# Patient Record
Sex: MALE | Race: BLACK OR AFRICAN AMERICAN | Employment: FULL TIME | URBAN - METROPOLITAN AREA
[De-identification: names, ages, dates, MRNs, and addresses within clinical notes are randomized per-mention and may not be internally consistent; named-entity substitution may affect disease eponyms.]

---

## 2017-11-07 ENCOUNTER — HOSPITAL ENCOUNTER (OUTPATIENT)
Dept: GENERAL RADIOLOGY | Age: 42
Discharge: HOME | End: 2017-11-07
Payer: COMMERCIAL

## 2017-11-07 DIAGNOSIS — M54.9 BACK PAIN: ICD-10-CM

## 2017-11-07 PROCEDURE — 72110 X-RAY EXAM L-2 SPINE 4/>VWS: CPT

## 2018-01-02 ENCOUNTER — HOSPITAL ENCOUNTER (EMERGENCY)
Age: 43
Discharge: HOME | End: 2018-01-03
Attending: EMERGENCY MEDICINE | Admitting: EMERGENCY MEDICINE
Payer: COMMERCIAL

## 2018-01-02 VITALS
HEART RATE: 61 BPM | SYSTOLIC BLOOD PRESSURE: 156 MMHG | TEMPERATURE: 98 F | DIASTOLIC BLOOD PRESSURE: 113 MMHG | RESPIRATION RATE: 18 BRPM | OXYGEN SATURATION: 100 %

## 2018-01-02 DIAGNOSIS — M54.42 ACUTE BILATERAL LOW BACK PAIN WITH BILATERAL SCIATICA: Primary | ICD-10-CM

## 2018-01-02 DIAGNOSIS — M54.41 ACUTE BILATERAL LOW BACK PAIN WITH BILATERAL SCIATICA: Primary | ICD-10-CM

## 2018-01-02 PROCEDURE — 99283 EMERGENCY DEPT VISIT LOW MDM: CPT

## 2018-01-02 RX ORDER — LISINOPRIL 10 MG/1
TABLET ORAL DAILY
COMMUNITY

## 2018-01-02 RX ORDER — SIMVASTATIN 10 MG/1
TABLET, FILM COATED ORAL
COMMUNITY

## 2018-01-02 RX ORDER — MONTELUKAST SODIUM 10 MG/1
10 TABLET ORAL DAILY
COMMUNITY

## 2018-01-02 NOTE — LETTER
NOTIFICATION RETURN TO WORK / SCHOOL 
 
1/3/2018 12:21 AM 
 
Mr. Purvi Castillo [address] [place] [address] [phone] To Whom It May Concern: 
 
Purvi Castillo is currently under the care of [facility] EMERGENCY DEPT. He will return to work/school on: 1/5/18 If there are questions or concerns please have the patient contact our office. Sincerely, Salomón Nguyen MD

## 2018-01-03 PROCEDURE — 74011250636 HC RX REV CODE- 250/636: Performed by: EMERGENCY MEDICINE

## 2018-01-03 PROCEDURE — 74011250637 HC RX REV CODE- 250/637: Performed by: EMERGENCY MEDICINE

## 2018-01-03 RX ORDER — NAPROXEN 500 MG/1
500 TABLET ORAL 2 TIMES DAILY WITH MEALS
Qty: 20 TAB | Refills: 1 | Status: SHIPPED | OUTPATIENT
Start: 2018-01-03

## 2018-01-03 RX ORDER — CYCLOBENZAPRINE HCL 5 MG
5 TABLET ORAL
Qty: 15 TAB | Refills: 0 | Status: SHIPPED | OUTPATIENT
Start: 2018-01-03

## 2018-01-03 RX ORDER — ACETAMINOPHEN 500 MG
1000 TABLET ORAL
Qty: 50 TAB | Refills: 0 | Status: SHIPPED | OUTPATIENT
Start: 2018-01-03

## 2018-01-03 RX ORDER — ACETAMINOPHEN 500 MG
1000 TABLET ORAL
Status: COMPLETED | OUTPATIENT
Start: 2018-01-03 | End: 2018-01-03

## 2018-01-03 RX ORDER — DEXAMETHASONE 4 MG/1
12 TABLET ORAL
Status: COMPLETED | OUTPATIENT
Start: 2018-01-03 | End: 2018-01-03

## 2018-01-03 RX ORDER — NAPROXEN 250 MG/1
500 TABLET ORAL
Status: COMPLETED | OUTPATIENT
Start: 2018-01-03 | End: 2018-01-03

## 2018-01-03 RX ADMIN — DEXAMETHASONE 12 MG: 4 TABLET ORAL at 00:23

## 2018-01-03 RX ADMIN — NAPROXEN 500 MG: 250 TABLET ORAL at 00:23

## 2018-01-03 RX ADMIN — ACETAMINOPHEN 1000 MG: 500 TABLET ORAL at 00:23

## 2018-01-03 NOTE — ED PROVIDER NOTES
HPI Comments: Beryle Din is a 38 y.o. Male with no sig pmh other than htn with c/o recurrent worsening lower back pain more so on right sided with leg sx, heaviness, tingling. Has had pain off/on for last month saw pcp who referred to neurology, saw for evaluation for emg but wa told to wait to see how things went. Pt states pain improved, then got worse yesterday with increased heaviness in leg, diff sleeping last night. No saddle anesthesia, diff emptying bladder, defacation issues. Is able to ambulate. Nothing taken. Sx worse with certain positions. Tightness type pain. No h/o cancer, weight loss, previous back surgeries, immunocompromised disease    The history is provided by the patient. Past Medical History:   Diagnosis Date    HTN (hypertension)     Sleep apnea        Past Surgical History:   Procedure Laterality Date    HX ORTHOPAEDIC           History reviewed. No pertinent family history. Social History     Social History    Marital status:      Spouse name: N/A    Number of children: N/A    Years of education: N/A     Occupational History    Not on file. Social History Main Topics    Smoking status: Never Smoker    Smokeless tobacco: Never Used    Alcohol use No    Drug use: No    Sexual activity: Not on file     Other Topics Concern    Not on file     Social History Narrative    No narrative on file         ALLERGIES: Shellfish derived    Review of Systems   Constitutional: Negative for fever. HENT: Negative for sore throat. Respiratory: Negative for shortness of breath. Cardiovascular: Negative for chest pain. Gastrointestinal: Negative for abdominal pain. Endocrine: Negative for polyuria. Genitourinary: Negative for difficulty urinating. Musculoskeletal: Positive for back pain and myalgias. Negative for gait problem. Skin: Negative for rash. Allergic/Immunologic: Negative for immunocompromised state. Neurological: Negative for syncope. Psychiatric/Behavioral: Positive for sleep disturbance. Vitals:    01/02/18 2354   BP: (!) 156/113   Pulse: 61   Resp: 18   Temp: 98 °F (36.7 °C)   SpO2: 100%            Physical Exam   Constitutional: He is oriented to person, place, and time. Non-toxic appearance. He does not appear ill. No distress. HENT:   Head: Normocephalic and atraumatic. Right Ear: External ear normal.   Left Ear: External ear normal.   Nose: Nose normal.   Mouth/Throat: Oropharynx is clear and moist. No oropharyngeal exudate. Eyes: Conjunctivae are normal.   Neck: Normal range of motion. Cardiovascular: Normal rate, regular rhythm, normal heart sounds and intact distal pulses. Pulmonary/Chest: Effort normal and breath sounds normal. No respiratory distress. Abdominal: Soft. There is no tenderness. Musculoskeletal: Normal range of motion. He exhibits no edema. Lumbar back: He exhibits pain and spasm. He exhibits normal range of motion, no bony tenderness, no swelling, no edema and no deformity. Back:    Neurological: He is alert and oriented to person, place, and time. Nl gait. No appreciable le weakness, loss of sensation lt     Skin: Skin is warm and dry. He is not diaphoretic. Psychiatric: His behavior is normal.   Nursing note and vitals reviewed. [facility]  ED Course       Procedures  Vitals:  Patient Vitals for the past 12 hrs:   Temp Pulse Resp BP SpO2   01/02/18 2354 98 °F (36.7 °C) 61 18 (!) 156/113 100 %         Medications ordered:   Medications   dexamethasone (DECADRON) tablet 12 mg (12 mg Oral Given 1/3/18 0023)   naproxen (NAPROSYN) tablet 500 mg (500 mg Oral Given 1/3/18 0023)   acetaminophen (TYLENOL) tablet 1,000 mg (1,000 mg Oral Given 1/3/18 0023)         Lab findings:  No results found for this or any previous visit (from the past 12 hour(s)).     EKG interpretation by ED Physician:      X-Ray, CT or other radiology findings or impressions:  No orders to display   IMPRESSION: 1. Mild lumbar spondylosis as above, without superimposed acute radiographic  abnormality.   11/7/17    Progress notes, Consult notes or additional Procedure notes:   Doubt need for new imaging. No indication for new imaging, consultation  I have discussed with patient and/or family/sig other the results, interpretation of any imaging if performed, suspected diagnosis and treatment plan to include instructions regarding the diagnoses listed to which understanding was expressed with all questions answered        Reevaluation of patient:   Stable for dc    Disposition:  Diagnosis:   1. Acute bilateral low back pain with bilateral sciatica        Disposition: home      Follow-up Information     Follow up With Details Comments Eduar Tony MD Schedule an appointment as soon as possible for a visit  [address] [place] [address]  [phone]      Call the neurologist you saw to get new appointment for repeat evaluation for recurrent pain               Patient's Medications   Start Taking    ACETAMINOPHEN (TYLENOL EXTRA STRENGTH) 500 MG TABLET    Take 2 Tabs by mouth every six (6) hours as needed for Pain. CYCLOBENZAPRINE (FLEXERIL) 5 MG TABLET    Take 1 Tab by mouth nightly as needed for Muscle Spasm(s). NAPROXEN (NAPROSYN) 500 MG TABLET    Take 1 Tab by mouth two (2) times daily (with meals). Continue Taking    BUPROPION HCL (WELLBUTRIN PO)    Take  by mouth. LISINOPRIL (PRINIVIL, ZESTRIL) 10 MG TABLET    Take  by mouth daily. METOPROLOL TARTRATE PO    Take  by mouth. MONTELUKAST (SINGULAIR) 10 MG TABLET    Take 10 mg by mouth daily. SIMVASTATIN (ZOCOR) 10 MG TABLET    Take  by mouth nightly.    These Medications have changed    No medications on file   Stop Taking    No medications on file

## 2024-02-21 ENCOUNTER — HOSPITAL ENCOUNTER (OUTPATIENT)
Facility: HOSPITAL | Age: 49
Setting detail: RECURRING SERIES
Discharge: HOME | End: 2024-02-24
Payer: COMMERCIAL

## 2024-02-21 PROCEDURE — 97545 WORK HARDENING: CPT

## 2024-02-21 PROCEDURE — 97162 PT EVAL MOD COMPLEX 30 MIN: CPT

## 2024-02-21 NOTE — PROGRESS NOTES
[facility]  [address], [address], [place] [address] - Phone: [phone]  Fax: [phone]      PLAN OF CARE / STATEMENT OF MEDICAL NECESSITY FOR PHYSICAL THERAPY SERVICES  Patient Name: Kennedy Jurado : 1975   Medical   Diagnosis: Generalized weakness Treatment Diagnosis: Generalized weakness   Onset Date: 24     Referral Source: David Collado* Start of Care (SOC): 2024   Prior Hospitalization: See medical history Provider #: [id number]   Prior Level of Function: No difficulties with ADLs/ work activity   Comorbidities: OA of knees, back pain   Medications: Verified on Patient Summary List   The Plan of Care and following information is based on the information from the initial evaluation.   Evaluation Complexity:  History:  MEDIUM  Complexity : 1-2 comorbidities / personal factors will impact the outcome/ POC ; Examination:  MEDIUM Complexity : 3 Standardized tests and measures addressin body structure, function, activity limitation and / or participation in recreation  ;Presentation:  MEDIUM Complexity : Evolving with changing characteristics  ;Clinical Decision Making:  Other outcome measures patient history/ demonstration  MEDIUM FOTO score = an established functional score where 100 = no disability  Overall Complexity Rating: MEDIUM  ================================================================  Employer: [employer]  Job Title: PT [employer] and     Assessment / key information:  Kennedy Jurado is a 48 who has been referred for work conditioning.   He reports not feeling well on the day of 24.  He was seen at a local hospital and diagnosed with the flu.  He was prescribed a course of medication to include prednisone.  He had an allergic reaction to the prednisone and developed Rhabdomyolysis for which he was hospitalized for 11 days.  This resulted in acute kidney failure and anemia.

## 2024-02-21 NOTE — PROGRESS NOTES
PHYSICAL / OCCUPATIONAL THERAPY - DAILY TREATMENT NOTE (updated )    Patient Name: Kennedy uJrado    Date: 2024    : 1975  Insurance: Payor: [employer] / Plan: [facility] / Product Type: *No Product type* /      Patient  verified yes     Visit #   Current / Total 1 20   Time   In / Out 11 1230   Pain   In / Out - -   Subjective Functional Status/Changes: See Eval/ POC         TREATMENT AREA =  Muscle weakness (generalized) [M62.81]  Acute kidney failure, unspecified [N17.9]    OBJECTIVE      Therapeutic Procedures:  Tx Min Billable or 1:1 Min (if diff from Tx Min) Procedure, Rationale, Specifics     30623 Work Hardening/Simulation Init 2 hrs: To restore Pts functional level and physical capacity so they can safely return to their occupation.     Details if applicable:  intro to program, establish baselines, job description review                           [facility] Totals Reminder: bill using total billable min of TIMED therapeutic procedures (example: do not include dry needle or estim unattended, both untimed codes, in totals to left)  8-22 min = 1 unit; 23-37 min = 2 units; 38-52 min = 3 units; 53-67 min = 4 units; 68-82 min = 5 units   Total Total     [x]  Patient Education billed concurrently with other procedures   [x] Review HEP    [] Progressed/Changed HEP, detail:    [] Other detail:       Objective Information/Functional Measures/Assessment: [x]  See POC    Goals:  [x]  See POC    PLAN  yes Continue plan of care  []  Upgrade activities as tolerated  []  Discharge due to :  []  Other:    Sheyla Olivares PT    2024    12:44 PM    Future Appointments   Date Time Provider Department Center   2024 11:40 AM Sheyla Olivares, PT [facility]   2024 10:20 AM Sheyla Olivares, PT [facility]   2024 11:40 AM Sheyla Olivares, PT [facility]   3/1/2024 10:20 AM Sheyla Olivares, PT [facility]   3/4/2024 10:20 AM Sheyla Olivares, PT [facility]   3/6/2024 10:20 AM

## 2024-02-23 ENCOUNTER — HOSPITAL ENCOUNTER (OUTPATIENT)
Facility: HOSPITAL | Age: 49
Setting detail: RECURRING SERIES
Discharge: HOME | End: 2024-02-26
Payer: COMMERCIAL

## 2024-02-23 PROCEDURE — 97545 WORK HARDENING: CPT

## 2024-02-23 NOTE — PROGRESS NOTES
PHYSICAL / OCCUPATIONAL THERAPY - DAILY TREATMENT NOTE (updated )    Patient Name: Kennedy Jurado    Date: 2024    : 1975  Insurance: Payor: [employer]ER / Plan: [employer] [facility] [employer] / Product Type: *No Product type* /      Patient  verified Yes     Visit #   Current / Total 2 20   Time   In / Out 1140 1250   Pain   In / Out 1-2 1-2   Subjective Functional Status/Changes: Did ok after initial visit.     TREATMENT AREA =  Muscle weakness (generalized) [M62.81]  Acute kidney failure, unspecified [N17.9]    OBJECTIVE        Therapeutic Procedures:    81038 Work Hardening/Simulation Init 2 hrs: To restore Pts functional level and physical capacity so they can safely return to their occupation.    Tx Min Billable or 1:1 Min   (if diff from Tx Min) Details:   60  See flow sheet as applicable         [facility] Totals Reminder: bill using total billable min of TIMED therapeutic procedures (example: do not include dry needle or estim unattended, both untimed codes, in totals to left)  8-22 min = 1 unit; 23-37 min = 2 units; 38-52 min = 3 units; 53-67 min = 4 units; 68-82 min = 5 units   Total Total     [x]  Patient Education billed concurrently with other procedures   [x] Review HEP    [] Progressed/Changed HEP, detail:    [] Other detail:       Objective Information/Functional Measures/Assessment    material handling:see flow sheet    static postures- see flow sheet    Pt reported dizziness following material handling activities.  Noted clamminess.  BP taken at 139/107  Pulse O2= 96    Patient sat and monitored 15 minutes.  Reported feeling fine.  Driven home by family member      Patient will continue to benefit from skilled PT / OT services to modify and progress therapeutic interventions, analyze and address functional mobility deficits, analyze and address ROM deficits, analyze and address strength deficits, and analyze and cue for proper movement patterns to address functional deficits and attain

## 2024-02-26 ENCOUNTER — HOSPITAL ENCOUNTER (OUTPATIENT)
Facility: HOSPITAL | Age: 49
Setting detail: RECURRING SERIES
Discharge: HOME | End: 2024-02-29
Payer: COMMERCIAL

## 2024-02-26 PROCEDURE — 97545 WORK HARDENING: CPT

## 2024-02-26 NOTE — PROGRESS NOTES
PHYSICAL / OCCUPATIONAL THERAPY - DAILY TREATMENT NOTE (updated )    Patient Name: Kennedy Jurado    Date: 2024    : 1975  Insurance: Payor: [employer]ER / Plan: [employer] [facility] / Product Type: *No Product type* /      Patient  verified Yes     Visit #   Current / Total 3 20   Time   In / Out 900 1010   Pain   In / Out 1 1   Subjective Functional Status/Changes: Recovered well after last visit.  No reported soreness.     TREATMENT AREA =  Muscle weakness (generalized) [M62.81]  Acute kidney failure, unspecified [N17.9]    OBJECTIVE    Therapeutic Procedures:    42158 Work Hardening/Simulation Init 2 hrs: To restore Pts functional level and physical capacity so they can safely return to their occupation.    Tx Min Billable or 1:1 Min   (if diff from Tx Min) Details:   70  See flow sheet as applicable         [facility] Totals Reminder: bill using total billable min of TIMED therapeutic procedures (example: do not include dry needle or estim unattended, both untimed codes, in totals to left)  8-22 min = 1 unit; 23-37 min = 2 units; 38-52 min = 3 units; 53-67 min = 4 units; 68-82 min = 5 units   Total Total     [x]  Patient Education billed concurrently with other procedures   [x] Review HEP    [] Progressed/Changed HEP, detail:    [] Other detail:       Objective Information/Functional Measures/Assessment    material handling:added push/ pull with 20# in sled      static postures- see flow sheet.  Added 10 min stand      Patient will continue to benefit from skilled PT / OT services to modify and progress therapeutic interventions, analyze and address functional mobility deficits, analyze and address ROM deficits, analyze and address strength deficits, and analyze and cue for proper movement patterns to address functional deficits and attain remaining goals.    Progress toward goals / Updated goals:  []  See Progress Note/Recertification  __x_ progression of material handling- see flow

## 2024-02-28 ENCOUNTER — HOSPITAL ENCOUNTER (OUTPATIENT)
Facility: HOSPITAL | Age: 49
Setting detail: RECURRING SERIES
Discharge: HOME | End: 2024-03-02
Payer: COMMERCIAL

## 2024-02-28 PROCEDURE — 97545 WORK HARDENING: CPT

## 2024-02-28 NOTE — PROGRESS NOTES
PHYSICAL / OCCUPATIONAL THERAPY - DAILY TREATMENT NOTE (updated )    Patient Name: Kennedy Jurado    Date: 2024    : 1975  Insurance: Payor: [employer]ER / Plan: [employer] [facility] [employer] / Product Type: *No Product type* /      Patient  verified Yes     Visit #   Current / Total 4 20   Time   In / Out 10 1130   Pain   In / Out 1 0   Subjective Functional Status/Changes: Did well after last visit.  Able to perform ADLs after     TREATMENT AREA =  Muscle weakness (generalized) [M62.81]  Acute kidney failure, unspecified [N17.9]    OBJECTIVE        Therapeutic Procedures:    72031 Work Hardening/Simulation Init 2 hrs: To restore Pts functional level and physical capacity so they can safely return to their occupation.    Tx Min Billable or 1:1 Min   (if diff from Tx Min) Details:   90  See flow sheet as applicable       90  [facility] Totals Reminder: bill using total billable min of TIMED therapeutic procedures (example: do not include dry needle or estim unattended, both untimed codes, in totals to left)  8-22 min = 1 unit; 23-37 min = 2 units; 38-52 min = 3 units; 53-67 min = 4 units; 68-82 min = 5 units   Total Total     [x]  Patient Education billed concurrently with other procedures   [x] Review HEP    [] Progressed/Changed HEP, detail:    [] Other detail:       Objective Information/Functional Measures/Assessment    material handling:    static postures- see flow sheet; increased stand time, increase amb time, increased duration of WC      Patient will continue to benefit from skilled PT / OT services to modify and progress therapeutic interventions, analyze and address functional mobility deficits, analyze and address ROM deficits, analyze and address strength deficits, and analyze and cue for proper movement patterns to address functional deficits and attain remaining goals.    Progress toward goals / Updated goals:  []  See Progress Note/Recertification  ___ progression of material handling- see

## 2024-03-01 ENCOUNTER — HOSPITAL ENCOUNTER (OUTPATIENT)
Facility: HOSPITAL | Age: 49
Setting detail: RECURRING SERIES
Discharge: HOME | End: 2024-03-04
Payer: COMMERCIAL

## 2024-03-01 PROCEDURE — 97545 WORK HARDENING: CPT

## 2024-03-01 NOTE — PROGRESS NOTES
PHYSICAL / OCCUPATIONAL THERAPY - DAILY TREATMENT NOTE (updated )    Patient Name: Kennedy Jurado    Date: 3/1/2024    : 1975  Insurance: Payor: [employer]ER / Plan: [employer] [facility] [employer] / Product Type: *No Product type* /      Patient  verified Yes     Visit #   Current / Total 5 20   Time   In / Out 950 1150   Pain   In / Out 3 1   Subjective Functional Status/Changes: Doing OK--tolerating duration increase well     TREATMENT AREA =  Muscle weakness (generalized) [M62.81]  Acute kidney failure, unspecified [N17.9]    OBJECTIVE        Therapeutic Procedures:    88142 Work Hardening/Simulation Init 2 hrs: To restore Pts functional level and physical capacity so they can safely return to their occupation.    Tx Min Billable or 1:1 Min   (if diff from Tx Min) Details:   120  See flow sheet as applicable       120  [facility] Totals Reminder: bill using total billable min of TIMED therapeutic procedures (example: do not include dry needle or estim unattended, both untimed codes, in totals to left)  8-22 min = 1 unit; 23-37 min = 2 units; 38-52 min = 3 units; 53-67 min = 4 units; 68-82 min = 5 units   Total Total     [x]  Patient Education billed concurrently with other procedures   [x] Review HEP    [] Progressed/Changed HEP, detail:    [] Other detail:       Objective Information/Functional Measures/Assessment    material handling:increase reps of lifts, increase weight and reps with push/ pull    static postures- see flow sheet      Patient will continue to benefit from skilled PT / OT services to modify and progress therapeutic interventions, analyze and address functional mobility deficits, analyze and address ROM deficits, analyze and address strength deficits, and analyze and cue for proper movement patterns to address functional deficits and attain remaining goals.    Progress toward goals / Updated goals:  []  See Progress Note/Recertification  __x_ progression of material handling- see flow

## 2024-03-04 ENCOUNTER — HOSPITAL ENCOUNTER (OUTPATIENT)
Facility: HOSPITAL | Age: 49
Setting detail: RECURRING SERIES
Discharge: HOME | End: 2024-03-07
Payer: COMMERCIAL

## 2024-03-04 PROCEDURE — 97545 WORK HARDENING: CPT

## 2024-03-04 NOTE — PROGRESS NOTES
/ Updated goals:  []  See Progress Note/Recertification  ___ progression of material handling- see flow sheet    ___ progression of static postures- see flow sheet    ___ progression of reach/ / handling--see flow sheet          PLAN  - Continue Plan of Care  - Upgrade activities as tolerated    Sheyla Olivares, PT    3/4/2024    7:53 AM    Future Appointments   Date Time Provider Department Center   3/4/2024 10:20 AM Olivares, Sheyla, PT [facility]   3/6/2024 10:20 AM Olivares, Sheyla, PT [facility]   3/8/2024 12:20 PM Olivares, Sheyla, PT [facility]   3/11/2024 10:20 AM Olivares, Sheyla, PT [facility]   3/13/2024 10:20 AM Olivares, Sheyla, PT [facility]   3/15/2024  9:40 AM Taye Carrillo, PT [facility]   3/18/2024 10:20 AM Taye Carrillo, PT [facility]   3/20/2024 10:20 AM Olivares, Sheyla, PT [facility]   3/22/2024 10:20 AM Olivares, Sheyla, PT [facility]   3/25/2024 10:20 AM Olivares, Sheyla, PT [facility]   3/27/2024 10:20 AM Olivares, Sheyla, PT [facility]   3/29/2024 10:20 AM Olivares, Sheyla, PT [facility]

## 2024-03-06 ENCOUNTER — HOSPITAL ENCOUNTER (OUTPATIENT)
Facility: HOSPITAL | Age: 49
Setting detail: RECURRING SERIES
Discharge: HOME | End: 2024-03-09
Payer: COMMERCIAL

## 2024-03-06 PROCEDURE — 97545 WORK HARDENING: CPT

## 2024-03-06 NOTE — PROGRESS NOTES
PHYSICAL / OCCUPATIONAL THERAPY - DAILY TREATMENT NOTE (updated )    Patient Name: Kennedy Jurado    Date: 3/6/2024    : 1975  Insurance: Payor: [employer]ER / Plan: [employer] [facility] [employer] / Product Type: *No Product type* /      Patient  verified Yes     Visit #   Current / Total 6 20   Time   In / Out 10 12   Pain   In / Out 2 1   Subjective Functional Status/Changes: Tolerating activity better.  Went to gym with wife yesterday     TREATMENT AREA =  Muscle weakness (generalized) [M62.81]  Acute kidney failure, unspecified [N17.9]    OBJECTIVE        Therapeutic Procedures:    81581 Work Hardening/Simulation Init 2 hrs: To restore Pts functional level and physical capacity so they can safely return to their occupation.    Tx Min Billable or 1:1 Min   (if diff from Tx Min) Details:   120  See flow sheet as applicable       120   [facility] Totals Reminder: bill using total billable min of TIMED therapeutic procedures (example: do not include dry needle or estim unattended, both untimed codes, in totals to left)  8-22 min = 1 unit; 23-37 min = 2 units; 38-52 min = 3 units; 53-67 min = 4 units; 68-82 min = 5 units   Total Total     [x]  Patient Education billed concurrently with other procedures   [x] Review HEP    [] Progressed/Changed HEP, detail:    [] Other detail:       Objective Information/Functional Measures/Assessment    material handling:lifts/ carries at 35#    static postures- see flow sheet      Patient will continue to benefit from skilled PT / OT services to modify and progress therapeutic interventions, analyze and address functional mobility deficits, analyze and address ROM deficits, analyze and address strength deficits, and analyze and cue for proper movement patterns to address functional deficits and attain remaining goals.    Progress toward goals / Updated goals:  []  See Progress Note/Recertification  __x_ progression of material handling- see flow sheet    __x_ progression of static

## 2024-03-08 ENCOUNTER — HOSPITAL ENCOUNTER (OUTPATIENT)
Facility: HOSPITAL | Age: 49
Setting detail: RECURRING SERIES
Discharge: HOME | End: 2024-03-11
Payer: COMMERCIAL

## 2024-03-08 PROCEDURE — 97545 WORK HARDENING: CPT

## 2024-03-08 NOTE — PROGRESS NOTES
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    Tx Min Billable or 1:1 Min   (if diff from Tx Min) Details:   120  See flow sheet as applicable       120   [facility] Totals Reminder: bill using total billable min of TIMED therapeutic procedures (example: do not include dry needle or estim unattended, both untimed codes, in totals to left)  8-22 min = 1 unit; 23-37 min = 2 units; 38-52 min = 3 units; 53-67 min = 4 units; 68-82 min = 5 units   Total Total     [x]  Patient Education billed concurrently with other procedures   [x] Review HEP    [] Progressed/Changed HEP, detail:    [] Other detail:       Objective Information/Functional Measures/Assessment    material handling:increased reps with 35# lifts, increase distance carried to 40 feet  static postures- see flow sheet      Patient will continue to benefit from skilled PT / OT services to modify and progress therapeutic interventions, analyze and address functional mobility deficits, analyze and address ROM deficits, analyze and address strength deficits, and analyze and cue for proper movement patterns to address functional deficits and attain remaining goals.    Progress toward goals / Updated goals:  []  See Progress Note/Recertification  _x__ progression of material handling- see

## 2024-03-11 ENCOUNTER — HOSPITAL ENCOUNTER (OUTPATIENT)
Facility: HOSPITAL | Age: 49
Setting detail: RECURRING SERIES
Discharge: HOME | End: 2024-03-14
Payer: COMMERCIAL

## 2024-03-11 PROCEDURE — 97545 WORK HARDENING: CPT

## 2024-03-11 NOTE — PROGRESS NOTES
progression of material handling- see flow sheet    ___ progression of static postures- see flow sheet    ___ progression of reach/ / handling--see flow sheet          PLAN  - Continue Plan of Care  - Upgrade activities as tolerated    Sheyla Olivares, JOHN    3/11/2024    10:20 AM    Future Appointments   Date Time Provider Department Center   3/13/2024 10:20 AM Sheyla Olivares, PT [facility]   3/15/2024  9:40 AM Taye Carrillo, PT [facility]   3/18/2024 10:20 AM Taye Carrillo, PT [facility]   3/20/2024 10:20 AM Sheyla Olivares, PT [facility]   3/22/2024 10:20 AM Sheyla Olivares, PT [facility]   3/25/2024 10:20 AM Sheyla Olivares, PT [facility]   3/27/2024 10:20 AM Sheyla Olivares, PT [facility]   3/29/2024 10:20 AM Sheyla Olivares, PT [facility]

## 2024-03-13 ENCOUNTER — HOSPITAL ENCOUNTER (OUTPATIENT)
Facility: HOSPITAL | Age: 49
Setting detail: RECURRING SERIES
Discharge: HOME | End: 2024-03-16
Payer: COMMERCIAL

## 2024-03-13 PROCEDURE — 97545 WORK HARDENING: CPT

## 2024-03-13 NOTE — PROGRESS NOTES
PHYSICAL / OCCUPATIONAL THERAPY - DAILY TREATMENT NOTE (updated )    Patient Name: Kennedy Jurado    Date: 3/13/2024    : 1975  Insurance: Payor: [employer] / Plan: [employer] [facility] [employer] / Product Type: *No Product type* /      Patient  verified Yes     Visit #   Current / Total 9 20   Time   In / Out 10 12   Pain   In / Out 0 0   Subjective Functional Status/Changes: 90% improved  Program is comfortably challenging     TREATMENT AREA =  Muscle weakness (generalized) [M62.81]  Acute kidney failure, unspecified [N17.9]    OBJECTIVE    Therapeutic Procedures:    29596 Work Hardening/Simulation Init 2 hrs: To restore Pts functional level and physical capacity so they can safely return to their occupation.    Tx Min Billable or 1:1 Min   (if diff from Tx Min) Details:   120  See flow sheet as applicable       120  MC BC Totals Reminder: bill using total billable min of TIMED therapeutic procedures (example: do not include dry needle or estim unattended, both untimed codes, in totals to left)  8-22 min = 1 unit; 23-37 min = 2 units; 38-52 min = 3 units; 53-67 min = 4 units; 68-82 min = 5 units   Total Total     [x]  Patient Education billed concurrently with other procedures   [x] Review HEP    [] Progressed/Changed HEP, detail:    [] Other detail:       Objective Information/Functional Measures/Assessment    material handling:increasing reps with material handling; comfortably challenged    static postures- see flow sheet      Patient will continue to benefit from skilled PT / OT services to modify and progress therapeutic interventions, analyze and address functional mobility deficits, analyze and address ROM deficits, analyze and address strength deficits, and analyze and cue for proper movement patterns to address functional deficits and attain remaining goals.    Progress toward goals / Updated goals:  []  See Progress Note/Recertification  _x__ progression of material handling- see flow sheet

## 2024-03-13 NOTE — PROGRESS NOTES
CLAIRE CISNEROS [facility]  [address], [address], [place] [address]  Phone: [phone]  Fax: [phone]  PROGRESS NOTE - WORKERS COMP  Patient Name: Kennedy Jurado : 1975   Treatment/Medical Diagnosis: Muscle weakness (generalized) [M62.81]  Acute kidney failure, unspecified [N17.9]   Referral Source: David Collado*     Date of Initial Visit: 24 Attended Visits: 9 Missed Visits: -     FUNCTIONAL OUTCOME QUESTIONNAIRE USED:  [] LEFS [] QDASH [] Oswestry [] NDI  [] Other:              PROGRESS >75%  improved 50-75%  improved 25-50%   improved <25%   improved   [x] Increased ROM [] [x] [] []   [x] Increased Strength  [] [x] [] []   [x] Improved Gait [] [x] [] []   [x] Improved Balance [] [x] [] []   [x] Decrease Pain/symptoms [x] [] [] []   [] Improved ADLs/Self care [] [] [] []   [x] Return to Work Activity   [] [x] [] []     FUNCTIONAL WORK RELATED CHANGES (Maximum Ability - Sit, Stand, Lift, Reach, Push, Pull):  Patient tolerates 2 hours of continuous activity with minimal difficulty.  He is able to perform lifts/ carries at 45#.  Standing tolerance has improved.  He requires less breaks when participating in the work conditioning program.     REMAINING DEFICITS:  Endurance for full day activity      [x]SAME GOALS/     [] NEW GOALS:       TREATMENT PROVIDED:         [] Modalities [] Manual Therapy [x] Ther Ex [x]Ther/Work Activity    [x] Neuro Re-ed []Gait Training [x] Patient Education [] HEP   [x] Body Mechanics [x] Lifting [] Posture    []  Other:     BARRIERS TO PROGRESS: no If YES please explain:     PLAN: [x] Continue Therapy with Anticipated DC in __2-4_ Weeks/Visits   [] Hold Therapy; Awaiting MD Orders    [] Recommend Work Conditioning   [] Recommend FCE   [] Hold Therapy; refer to MD for Medical Intervention   [] Discharge from Skilled Therapy    If you have any questions/comments please contact us directly at [phone]

## 2024-03-15 ENCOUNTER — HOSPITAL ENCOUNTER (OUTPATIENT)
Facility: HOSPITAL | Age: 49
Setting detail: RECURRING SERIES
Discharge: HOME | End: 2024-03-18
Payer: COMMERCIAL

## 2024-03-15 PROCEDURE — 97545 WORK HARDENING: CPT

## 2024-03-15 NOTE — PROGRESS NOTES
PHYSICAL / OCCUPATIONAL THERAPY - DAILY TREATMENT NOTE (updated )    Patient Name: Kennedy Jurado    Date: 3/15/2024    : 1975  Insurance: Payor: [employer] / Plan: [employer] [facility] [employer] / Product Type: *No Product type* /      Patient  verified Yes   Visit #   Current / Total 5 12   Time   In / Out 10:00 12:00   Pain   In / Out 0 0   Subjective Functional Status/Changes: Pt reports no issues prior to session     TREATMENT AREA =  Muscle weakness (generalized) [M62.81]  Acute kidney failure, unspecified [N17.9]    OBJECTIVE         19984 Work Hardening/Simulation Init 2 hrs: To restore Pts functional level and physical capacity so they can safely return to their occupation.    Tx Min Billable or 1:1 Min   (if diff from Tx Min) Details:   120   See flow sheet as applicable       Therapeutic Procedures:  [x]  Patient Education billed concurrently with other procedures   [x] Review HEP    [] Progressed/Changed HEP, detail:    [] Other detail:       Objective Information/Functional Measures/Assessment    No discomfort with exercises. Increased exercises per flowsheet    Patient will continue to benefit from skilled PT / OT services to modify and progress therapeutic interventions, analyze and address functional mobility deficits, analyze and address ROM deficits, and analyze and address strength deficits to address functional deficits and attain remaining goals.    Progress toward goals / Updated goals:  []  See Progress Note/Recertification    Good Progress to    [] STG    [x] LTG  1 as shown by improved ability to perform exercises    PLAN  Yes Continue plan of care  [x]  Upgrade activities as tolerated  []  Discharge due to :  []  Other:    Taye Carrillo PT    3/15/2024    2:22 PM    Future Appointments   Date Time Provider Department Center   3/18/2024 10:20 AM Taye Carrillo PT [facility]   3/20/2024 10:20 AM Sheyla Olivares PT [facility]   3/22/2024 10:20 AM Sheyla Olivares PT [facility]

## 2024-03-18 ENCOUNTER — HOSPITAL ENCOUNTER (OUTPATIENT)
Facility: HOSPITAL | Age: 49
Setting detail: RECURRING SERIES
Discharge: HOME | End: 2024-03-21
Payer: COMMERCIAL

## 2024-03-18 PROCEDURE — 97545 WORK HARDENING: CPT

## 2024-03-18 NOTE — PROGRESS NOTES
Sheyla, PT [facility]   3/22/2024 10:20 AM OlivaresNeisha zuñigaa, PT [facility]   3/25/2024 10:20 AM OlivaresNeisha zuñigaa, PT [facility]   3/27/2024 10:20 AM OlivaresNeisha zuñigaa, PT [facility]   3/29/2024 10:20 AM OlivaresNeisha zuñigaa, PT [facility]

## 2024-03-20 ENCOUNTER — HOSPITAL ENCOUNTER (OUTPATIENT)
Facility: HOSPITAL | Age: 49
Setting detail: RECURRING SERIES
Discharge: HOME | End: 2024-03-23
Payer: COMMERCIAL

## 2024-03-20 PROCEDURE — 97545 WORK HARDENING: CPT

## 2024-03-20 NOTE — PROGRESS NOTES
PHYSICAL / OCCUPATIONAL THERAPY - DAILY TREATMENT NOTE (updated )    Patient Name: Kennedy Jurado    Date: 3/20/2024    : 1975  Insurance: Payor: [employer] / Plan: [employer] [facility] [employer] / Product Type: *No Product type* /      Patient  verified Yes     Visit #   Current / Total 12    Time   In / Out 1000 1200   Pain   In / Out - -   Subjective Functional Status/Changes: Comfortably challenged rosita with increase in reps.    Saw MD.  Pleased with lab work.  To RTW after spring break     TREATMENT AREA =  Muscle weakness (generalized) [M62.81]  Acute kidney failure, unspecified [N17.9]    OBJECTIVE    Therapeutic Procedures:    05902 Work Hardening/Simulation Init 2 hrs: To restore Pts functional level and physical capacity so they can safely return to their occupation.    Tx Min Billable or 1:1 Min   (if diff from Tx Min) Details:   120  See flow sheet as applicable       120  MC BC Totals Reminder: bill using total billable min of TIMED therapeutic procedures (example: do not include dry needle or estim unattended, both untimed codes, in totals to left)  8-22 min = 1 unit; 23-37 min = 2 units; 38-52 min = 3 units; 53-67 min = 4 units; 68-82 min = 5 units   Total Total     [x]  Patient Education billed concurrently with other procedures   [x] Review HEP    [] Progressed/Changed HEP, detail:    [] Other detail:       Objective Information/Functional Measures/Assessment    material handlin# lifts/ carries.  Increase in reps, see flow sheet    static postures- see flow sheet; increased standing duration      Patient will continue to benefit from skilled PT / OT services to modify and progress therapeutic interventions, analyze and address functional mobility deficits, analyze and address ROM deficits, analyze and address strength deficits, and analyze and cue for proper movement patterns to address functional deficits and attain remaining goals.    Progress toward goals / Updated goals:  []  See

## 2024-03-22 ENCOUNTER — HOSPITAL ENCOUNTER (OUTPATIENT)
Facility: HOSPITAL | Age: 49
Setting detail: RECURRING SERIES
Discharge: HOME | End: 2024-03-25
Payer: COMMERCIAL

## 2024-03-22 PROCEDURE — 97545 WORK HARDENING: CPT

## 2024-03-22 NOTE — PROGRESS NOTES
PHYSICAL / OCCUPATIONAL THERAPY - DAILY TREATMENT NOTE (updated )    Patient Name: Kennedy Jurado    Date: 3/22/2024    : 1975  Insurance: Payor: [employer] / Plan: [employer] [facility] [employer] / Product Type: *No Product type* /      Patient  verified Yes     Visit #   Current / Total 13    Time   In / Out 1000 1200   Pain   In / Out - -   Subjective Functional Status/Changes: Doing well, comfortably challenged     TREATMENT AREA =  Muscle weakness (generalized) [M62.81]  Acute kidney failure, unspecified [N17.9]    OBJECTIVE      Therapeutic Procedures:    00568 Work Hardening/Simulation Init 2 hrs: To restore Pts functional level and physical capacity so they can safely return to their occupation.    Tx Min Billable or 1:1 Min   (if diff from Tx Min) Details:   120  See flow sheet as applicable       120  MC BC Totals Reminder: bill using total billable min of TIMED therapeutic procedures (example: do not include dry needle or estim unattended, both untimed codes, in totals to left)  8-22 min = 1 unit; 23-37 min = 2 units; 38-52 min = 3 units; 53-67 min = 4 units; 68-82 min = 5 units   Total Total     [x]  Patient Education billed concurrently with other procedures   [x] Review HEP    [] Progressed/Changed HEP, detail:    [] Other detail:       Objective Information/Functional Measures/Assessment    material handling:progressing reps with heaviest lift of 45#    static postures- see flow sheet      Patient will continue to benefit from skilled PT / OT services to modify and progress therapeutic interventions, analyze and address functional mobility deficits, analyze and address ROM deficits, analyze and address strength deficits, and analyze and cue for proper movement patterns to address functional deficits and attain remaining goals.    Progress toward goals / Updated goals:  []  See Progress Note/Recertification  __x_ progression of material handling- see flow sheet    ___ progression of static

## 2024-03-25 ENCOUNTER — HOSPITAL ENCOUNTER (OUTPATIENT)
Facility: HOSPITAL | Age: 49
Setting detail: RECURRING SERIES
Discharge: HOME | End: 2024-03-28
Payer: COMMERCIAL

## 2024-03-25 PROCEDURE — 97545 WORK HARDENING: CPT

## 2024-03-25 NOTE — PROGRESS NOTES
PHYSICAL / OCCUPATIONAL THERAPY - DAILY TREATMENT NOTE (updated )    Patient Name: Kennedy Jurado    Date: 3/25/2024    : 1975  Insurance: Payor: [employer] / Plan: [employer] [facility] [employer] / Product Type: *No Product type* /      Patient  verified Yes     Visit #   Current / Total 14    Time   In / Out 1000 1200   Pain   In / Out 0 0   Subjective Functional Status/Changes: Doing well.  No c/o.  Felt the 50# lift     TREATMENT AREA =  Muscle weakness (generalized) [M62.81]  Acute kidney failure, unspecified [N17.9]    OBJECTIVE        Therapeutic Procedures:    39927 Work Hardening/Simulation Init 2 hrs: To restore Pts functional level and physical capacity so they can safely return to their occupation.    Tx Min Billable or 1:1 Min   (if diff from Tx Min) Details:   120  See flow sheet as applicable       120   BC Totals Reminder: bill using total billable min of TIMED therapeutic procedures (example: do not include dry needle or estim unattended, both untimed codes, in totals to left)  8-22 min = 1 unit; 23-37 min = 2 units; 38-52 min = 3 units; 53-67 min = 4 units; 68-82 min = 5 units   Total Total     [x]  Patient Education billed concurrently with other procedures   [x] Review HEP    [] Progressed/Changed HEP, detail:    [] Other detail:       Objective Information/Functional Measures/Assessment    material handlin# lift and carry    static postures- see flow sheet      Patient will continue to benefit from skilled PT / OT services to modify and progress therapeutic interventions, analyze and address functional mobility deficits, analyze and address ROM deficits, analyze and address strength deficits, and analyze and cue for proper movement patterns to address functional deficits and attain remaining goals.    Progress toward goals / Updated goals:  []  See Progress Note/Recertification  _x__ progression of material handling- see flow sheet    ___ progression of static postures- see flow

## 2024-03-27 ENCOUNTER — HOSPITAL ENCOUNTER (OUTPATIENT)
Facility: HOSPITAL | Age: 49
Setting detail: RECURRING SERIES
Discharge: HOME | End: 2024-03-30
Payer: COMMERCIAL

## 2024-03-27 PROCEDURE — 97545 WORK HARDENING: CPT

## 2024-03-27 NOTE — PROGRESS NOTES
see flow sheet    ___ progression of reach/ / handling--see flow sheet        PLAN  - Continue Plan of Care  - Upgrade activities as tolerated    Sheyla Olivares PT    3/27/2024    8:41 AM    Future Appointments   Date Time Provider Department [place]   3/27/2024 10:20 AM [facility]   3/29/2024 10:20 AM [facility]

## 2024-03-29 ENCOUNTER — HOSPITAL ENCOUNTER (OUTPATIENT)
Facility: HOSPITAL | Age: 49
Setting detail: RECURRING SERIES
Discharge: HOME | End: 2024-04-01
Payer: COMMERCIAL

## 2024-03-29 PROCEDURE — 97545 WORK HARDENING: CPT

## 2024-03-29 NOTE — PROGRESS NOTES
CLAIRE CISNEROS [facility] ([place] [place]) - [facility]  [address], [address], [place] [address]  Phone: [phone]  Fax: [phone]  PT DISCHARGE NOTE - WORK CONDITIONING  Patient Name: Kennedy Jurado : 1975   Treatment/Medical Diagnosis: Muscle weakness (generalized) [M62.81]  Acute kidney failure, unspecified [N17.9]   Referral Source: David Collado*     Date of Initial Visit: 24 Attended Visits: 16 Missed Visits: 1           PROGRESS >75%  improved 50-75%  improved 25-50%   improved <25%   improved   [x] Increased ROM [x] [] [] []   [x] Increased Strength  [x] [] [] []   [x] Improved Gait [x] [] [] []   [x] Improved Balance [x] [] [] []   [x] Decrease Pain/symptoms [x] [] [] []   [x] Improved ADLs/Self care [x] [] [] []   [x] Return to Work Activity   [x] [] [] []     FUNCTIONAL WORK RELATED CHANGES (Maximum Ability - Sit, Stand, Lift, Reach, Push, Pull):  Patient is working at the full MEDIUM physical demand level.  He tolerates 2 hours of continuous activity to include cardiovascular, flexibility, strength, material handling and static postures.     REMAINING DEFICITS:  Patient has reached all LTGs and demonstrates the ability to RTW without restrictions      []SAME GOALS/     [] NEW GOALS:  N/a     TREATMENT PROVIDED:         [] Modalities [] Manual Therapy [x] Ther Ex [x]Ther/Work Activity    [x] Neuro Re-ed []Gait Training [x] Patient Education [] HEP   [x] Body Mechanics [x] Lifting [] Posture    []  Other:     BARRIERS TO PROGRESS: no If YES please explain: n/a    PLAN: [] Continue Therapy with Anticipated DC in ___ Weeks/Visits   [] Hold Therapy; Awaiting MD Orders    [] Recommend Work Conditioning   [] Recommend FCE   [] Hold Therapy; refer to MD for Medical Intervention   [x] Discharge from Skilled Therapy--Return to Work    If you have any questions/comments please contact us directly at [phone].   Thank you for allowing us to assist in

## 2024-03-29 NOTE — PROGRESS NOTES
PHYSICAL / OCCUPATIONAL THERAPY - DAILY TREATMENT NOTE (updated )    Patient Name: Kennedy Jurado    Date: 3/29/2024    : 1975  Insurance: Payor: [employer] / Plan: [employer] / Product Type: *No Product type* /      Patient  verified Yes     Visit #   Current / Total 16    Time   In / Out 10 12   Pain   In / Out - -   Subjective Functional Status/Changes: Ready for RTW     TREATMENT AREA =  Muscle weakness (generalized) [M62.81]  Acute kidney failure, unspecified [N17.9]    OBJECTIVE    Therapeutic Procedures:    23139 Work Hardening/Simulation Init 2 hrs: To restore Pts functional level and physical capacity so they can safely return to their occupation.    Tx Min Billable or 1:1 Min   (if diff from Tx Min) Details:   120  See flow sheet as applicable       120  MC BC Totals Reminder: bill using total billable min of TIMED therapeutic procedures (example: do not include dry needle or estim unattended, both untimed codes, in totals to left)  8-22 min = 1 unit; 23-37 min = 2 units; 38-52 min = 3 units; 53-67 min = 4 units; 68-82 min = 5 units   Total Total     [x]  Patient Education billed concurrently with other procedures   [x] Review HEP    [] Progressed/Changed HEP, detail:    [] Other detail:       Objective Information/Functional Measures/Assessment  Working at the medium physical demand level--meeting job demands    Patient will continue to benefit from skilled PT / OT services to modify and progress therapeutic interventions, analyze and address functional mobility deficits, analyze and address ROM deficits, analyze and address strength deficits, and analyze and cue for proper movement patterns to address functional deficits and attain remaining goals.    Progress toward goals / Updated goals:  []  See Progress Note/Recertification  Meeting LTGs    PLAN  Discharge from PT      Sheyla Olivares, PT    3/29/2024    8:40 AM    Future Appointments   Date Time Provider Department